# Patient Record
Sex: MALE | Race: OTHER | HISPANIC OR LATINO
[De-identification: names, ages, dates, MRNs, and addresses within clinical notes are randomized per-mention and may not be internally consistent; named-entity substitution may affect disease eponyms.]

---

## 2021-02-17 ENCOUNTER — APPOINTMENT (OUTPATIENT)
Dept: GASTROENTEROLOGY | Facility: CLINIC | Age: 31
End: 2021-02-17
Payer: COMMERCIAL

## 2021-02-17 VITALS
BODY MASS INDEX: 25.33 KG/M2 | TEMPERATURE: 97.8 F | HEIGHT: 65 IN | DIASTOLIC BLOOD PRESSURE: 80 MMHG | HEART RATE: 78 BPM | SYSTOLIC BLOOD PRESSURE: 120 MMHG | OXYGEN SATURATION: 98 % | WEIGHT: 152 LBS

## 2021-02-17 DIAGNOSIS — Z78.9 OTHER SPECIFIED HEALTH STATUS: ICD-10-CM

## 2021-02-17 DIAGNOSIS — U07.1 COVID-19: ICD-10-CM

## 2021-02-17 PROBLEM — Z00.00 ENCOUNTER FOR PREVENTIVE HEALTH EXAMINATION: Status: ACTIVE | Noted: 2021-02-17

## 2021-02-17 PROCEDURE — 99204 OFFICE O/P NEW MOD 45 MIN: CPT | Mod: 25

## 2021-02-17 PROCEDURE — 36415 COLL VENOUS BLD VENIPUNCTURE: CPT

## 2021-02-17 PROCEDURE — 99072 ADDL SUPL MATRL&STAF TM PHE: CPT

## 2021-02-17 RX ORDER — AMOXICILLIN 500 MG/1
500 CAPSULE ORAL
Qty: 45 | Refills: 0 | Status: DISCONTINUED | COMMUNITY
Start: 2020-12-07 | End: 2021-02-17

## 2021-02-17 RX ORDER — CLARITHROMYCIN 500 MG/1
500 TABLET, FILM COATED ORAL
Qty: 30 | Refills: 0 | Status: DISCONTINUED | COMMUNITY
Start: 2020-12-07 | End: 2021-02-17

## 2021-02-17 RX ORDER — AMOXICILLIN 875 MG/1
875 TABLET, FILM COATED ORAL
Qty: 20 | Refills: 0 | Status: DISCONTINUED | COMMUNITY
Start: 2020-12-06 | End: 2021-02-17

## 2021-02-17 RX ORDER — METRONIDAZOLE 500 MG/1
500 TABLET ORAL
Qty: 20 | Refills: 0 | Status: DISCONTINUED | COMMUNITY
Start: 2020-12-06 | End: 2021-02-17

## 2021-02-17 NOTE — CONSULT LETTER
[Dear  ___] : Dear  [unfilled], [Consult Letter:] : I had the pleasure of evaluating your patient, [unfilled]. [Please see my note below.] : Please see my note below. [Sincerely,] : Sincerely, [FreeTextEntry3] : Carson Fontaine MD\par tel: [phone]\par fax: [phone]\par

## 2021-02-17 NOTE — ASSESSMENT
[FreeTextEntry1] : 1.Post prandial diarrhea:  possible post COVID syndrome  +/- lactose intolerance. Labs and stool ordered\par \par 2. H/O :  H. pylori...will check stool test to document eradication\par \par 3. Elevated ALT;  Labs ordered\par \par Office follow in 2 weeks to review results\par \par The ordered labs/ bloods  were drawn / collected in my office\par

## 2021-02-17 NOTE — PHYSICAL EXAM
[General Appearance - Alert] : alert [General Appearance - In No Acute Distress] : in no acute distress [Sclera] : the sclera and conjunctiva were normal [Outer Ear] : the ears and nose were normal in appearance [Neck Appearance] : the appearance of the neck was normal [] : no respiratory distress [Bowel Sounds] : normal bowel sounds [FreeTextEntry1] : deferred [No Focal Deficits] : no focal deficits [Oriented To Time, Place, And Person] : oriented to person, place, and time

## 2021-02-17 NOTE — HISTORY OF PRESENT ILLNESS
[de-identified] : BUZZ ACE  is being evaluated at the request of Dr. Joan Stein for an opinion re: diarrhea. The patient reports  post prandial diarrhea after  greasy foods. Fist noticed during COVID infection in 4/2020. Sxs are significantly better but persists and are characterized at loose stools after some fatty meals and milk. Labs reviewed from 11/2020 and 12/2020 were notable for H. pylori  ( which was treated with Amox / Biaxin)  / Hep A Ab total was positive along with ALT = 48. Reports normal stool test ( reports not  available) . Previously also had generalized lower abdominal pain which has resolved. Denies nausea, vomiting, fever, chills, melena, hematemesis, BRBPR, weight loss. Admits to weight gain\par

## 2021-03-03 ENCOUNTER — APPOINTMENT (OUTPATIENT)
Dept: GASTROENTEROLOGY | Facility: CLINIC | Age: 31
End: 2021-03-03
Payer: COMMERCIAL

## 2021-03-03 VITALS
WEIGHT: 147 LBS | HEART RATE: 68 BPM | BODY MASS INDEX: 24.49 KG/M2 | OXYGEN SATURATION: 98 % | HEIGHT: 65 IN | TEMPERATURE: 97.6 F | DIASTOLIC BLOOD PRESSURE: 70 MMHG | SYSTOLIC BLOOD PRESSURE: 110 MMHG

## 2021-03-03 DIAGNOSIS — R79.89 OTHER SPECIFIED ABNORMAL FINDINGS OF BLOOD CHEMISTRY: ICD-10-CM

## 2021-03-03 DIAGNOSIS — R19.7 DIARRHEA, UNSPECIFIED: ICD-10-CM

## 2021-03-03 DIAGNOSIS — Z86.19 PERSONAL HISTORY OF OTHER INFECTIOUS AND PARASITIC DISEASES: ICD-10-CM

## 2021-03-03 PROCEDURE — 99214 OFFICE O/P EST MOD 30 MIN: CPT

## 2021-03-03 PROCEDURE — 99072 ADDL SUPL MATRL&STAF TM PHE: CPT

## 2021-03-03 RX ORDER — FLUTICASONE PROPIONATE 0.5 MG/G
0.05 CREAM TOPICAL
Qty: 60 | Refills: 0 | Status: DISCONTINUED | COMMUNITY
Start: 2020-12-10 | End: 2021-03-03

## 2021-03-03 RX ORDER — DICYCLOMINE HYDROCHLORIDE 20 MG/1
20 TABLET ORAL
Qty: 30 | Refills: 0 | Status: DISCONTINUED | COMMUNITY
Start: 2020-12-06 | End: 2021-03-03

## 2021-03-03 RX ORDER — BISMUTH SUBSALICYLATE 262 MG/1
262 TABLET ORAL
Qty: 40 | Refills: 0 | Status: DISCONTINUED | COMMUNITY
Start: 2020-12-06 | End: 2021-03-03

## 2021-03-03 RX ORDER — OMEPRAZOLE 20 MG/1
20 CAPSULE, DELAYED RELEASE ORAL
Qty: 60 | Refills: 0 | Status: DISCONTINUED | COMMUNITY
Start: 2020-12-07 | End: 2021-03-03

## 2021-03-03 RX ORDER — FAMOTIDINE 40 MG/1
40 TABLET, FILM COATED ORAL
Qty: 30 | Refills: 0 | Status: DISCONTINUED | COMMUNITY
Start: 2020-12-06 | End: 2021-03-03

## 2021-03-03 NOTE — PHYSICAL EXAM
[General Appearance - Alert] : alert [General Appearance - In No Acute Distress] : in no acute distress [Sclera] : the sclera and conjunctiva were normal [Outer Ear] : the ears and nose were normal in appearance [Neck Appearance] : the appearance of the neck was normal [] : no respiratory distress [Bowel Sounds] : normal bowel sounds [No Focal Deficits] : no focal deficits [Oriented To Time, Place, And Person] : oriented to person, place, and time [FreeTextEntry1] : deferred

## 2021-03-03 NOTE — HISTORY OF PRESENT ILLNESS
[de-identified] : Presents  for follow up...diarrhea essentially resolved.\par Last seen 2/2021 for diarrhea. Reported  post prandial diarrhea after  greasy foods. First noticed during COVID infection in 4/2020. Sxs were significantly better at initial evaluation  ( mostly loose stools after some fatty meals and milk) . Labs ordered were unremarkable. Stool studies ordered were submitted today\par \par Labs reviewed from 11/2020 and 12/2020 were notable for H. pylori  ( which was treated with Amox / Biaxin)  / Hep A Ab total was positive along with ALT = 48. Reports normal stool test ( reports not  available) . Previously also had generalized lower abdominal pain which has resolved. Denies nausea, vomiting, fever, chills, melena, hematemesis, BRBPR, weight loss. Admits to weight gain\par

## 2021-03-03 NOTE — ASSESSMENT
[FreeTextEntry1] : 1.Post prandial diarrhea: Essentially resolved as per patient.  Possible post COVID syndrome  +/- lactose intolerance. Labs unremarkable and stool ordered are pending.  Colonoscopy if diarrhea returns\par \par 2. H/O H. pylori...will check stool test to document eradication ( pending) \par \par 3. Elevated ALT:  normal repeat LFTs\par \par

## 2021-03-11 RX ORDER — BISMUTH SUBSALICYLATE 262MG/15ML
262 SUSPENSION, ORAL (FINAL DOSE FORM) ORAL EVERY 6 HOURS
Qty: 56 | Refills: 0 | Status: ACTIVE | COMMUNITY
Start: 2021-03-11 | End: 1900-01-01

## 2021-03-11 RX ORDER — TETRACYCLINE HYDROCHLORIDE 500 MG/1
500 CAPSULE ORAL
Qty: 56 | Refills: 0 | Status: ACTIVE | COMMUNITY
Start: 2021-03-11 | End: 1900-01-01

## 2021-03-11 RX ORDER — PANTOPRAZOLE 40 MG/1
40 TABLET, DELAYED RELEASE ORAL
Qty: 28 | Refills: 0 | Status: ACTIVE | COMMUNITY
Start: 2021-03-11 | End: 1900-01-01

## 2021-03-11 RX ORDER — METRONIDAZOLE 250 MG/1
250 TABLET ORAL EVERY 6 HOURS
Qty: 56 | Refills: 0 | Status: ACTIVE | COMMUNITY
Start: 2021-03-11 | End: 1900-01-01